# Patient Record
Sex: FEMALE | Race: WHITE | URBAN - METROPOLITAN AREA
[De-identification: names, ages, dates, MRNs, and addresses within clinical notes are randomized per-mention and may not be internally consistent; named-entity substitution may affect disease eponyms.]

---

## 2023-09-12 ENCOUNTER — APPOINTMENT (OUTPATIENT)
Dept: GENERAL RADIOLOGY | Age: 23
End: 2023-09-12

## 2023-09-12 ENCOUNTER — APPOINTMENT (OUTPATIENT)
Dept: CT IMAGING | Age: 23
End: 2023-09-12

## 2023-09-12 ENCOUNTER — HOSPITAL ENCOUNTER (EMERGENCY)
Age: 23
Discharge: OTHER INSTITUTION | End: 2023-09-12
Attending: EMERGENCY MEDICINE

## 2023-09-12 VITALS
WEIGHT: 196.9 LBS | RESPIRATION RATE: 14 BRPM | TEMPERATURE: 98.3 F | BODY MASS INDEX: 30.9 KG/M2 | OXYGEN SATURATION: 98 % | SYSTOLIC BLOOD PRESSURE: 115 MMHG | HEIGHT: 67 IN | HEART RATE: 78 BPM | DIASTOLIC BLOOD PRESSURE: 71 MMHG

## 2023-09-12 DIAGNOSIS — F12.90 CANNABIS USE DISORDER: ICD-10-CM

## 2023-09-12 DIAGNOSIS — Z86.59 HISTORY OF BIPOLAR DISORDER: ICD-10-CM

## 2023-09-12 DIAGNOSIS — F29 PSYCHOSIS, UNSPECIFIED PSYCHOSIS TYPE (HCC): Primary | ICD-10-CM

## 2023-09-12 LAB
ACETAMINOPHEN LEVEL: <5 UG/ML (ref 15–30)
ALBUMIN SERPL-MCNC: 4.2 GM/DL (ref 3.4–5)
ALCOHOL SCREEN SERUM: <0.01 %WT/VOL
ALP BLD-CCNC: 87 IU/L (ref 40–129)
ALT SERPL-CCNC: 36 U/L (ref 10–40)
AMPHETAMINES: NEGATIVE
ANION GAP SERPL CALCULATED.3IONS-SCNC: 12 MMOL/L (ref 4–16)
AST SERPL-CCNC: 66 IU/L (ref 15–37)
BARBITURATE SCREEN URINE: NEGATIVE
BASOPHILS ABSOLUTE: 0 K/CU MM
BASOPHILS RELATIVE PERCENT: 0.3 % (ref 0–1)
BENZODIAZEPINE SCREEN, URINE: NEGATIVE
BILIRUB SERPL-MCNC: 0.3 MG/DL (ref 0–1)
BILIRUBIN DIRECT: 0.2 MG/DL (ref 0–0.3)
BILIRUBIN, INDIRECT: 0.1 MG/DL (ref 0–0.7)
BUN SERPL-MCNC: 9 MG/DL (ref 6–23)
CALCIUM SERPL-MCNC: 8.9 MG/DL (ref 8.3–10.6)
CANNABINOID SCREEN URINE: ABNORMAL
CHLORIDE BLD-SCNC: 105 MMOL/L (ref 99–110)
CO2: 20 MMOL/L (ref 21–32)
COCAINE METABOLITE: NEGATIVE
CREAT SERPL-MCNC: 0.4 MG/DL (ref 0.6–1.1)
DIFFERENTIAL TYPE: ABNORMAL
DOSE AMOUNT: ABNORMAL
DOSE AMOUNT: ABNORMAL
DOSE TIME: ABNORMAL
DOSE TIME: ABNORMAL
EOSINOPHILS ABSOLUTE: 0.1 K/CU MM
EOSINOPHILS RELATIVE PERCENT: 1 % (ref 0–3)
FENTANYL URINE: NEGATIVE
GFR SERPL CREATININE-BSD FRML MDRD: >60 ML/MIN/1.73M2
GLUCOSE SERPL-MCNC: 95 MG/DL (ref 70–99)
HCG QUALITATIVE: NEGATIVE
HCT VFR BLD CALC: 42.4 % (ref 37–47)
HEMOGLOBIN: 13.5 GM/DL (ref 12.5–16)
IMMATURE NEUTROPHIL %: 0.4 % (ref 0–0.43)
LYMPHOCYTES ABSOLUTE: 2.1 K/CU MM
LYMPHOCYTES RELATIVE PERCENT: 17.8 % (ref 24–44)
MCH RBC QN AUTO: 26.3 PG (ref 27–31)
MCHC RBC AUTO-ENTMCNC: 31.8 % (ref 32–36)
MCV RBC AUTO: 82.5 FL (ref 78–100)
MONOCYTES ABSOLUTE: 0.7 K/CU MM
MONOCYTES RELATIVE PERCENT: 6 % (ref 0–4)
OPIATES, URINE: NEGATIVE
OXYCODONE: NEGATIVE
PDW BLD-RTO: 14.6 % (ref 11.7–14.9)
PLATELET # BLD: 395 K/CU MM (ref 140–440)
PMV BLD AUTO: 9.3 FL (ref 7.5–11.1)
POTASSIUM SERPL-SCNC: 3.8 MMOL/L (ref 3.5–5.1)
RBC # BLD: 5.14 M/CU MM (ref 4.2–5.4)
SALICYLATE LEVEL: <0.3 MG/DL (ref 15–30)
SARS-COV-2 RDRP RESP QL NAA+PROBE: NOT DETECTED
SEGMENTED NEUTROPHILS ABSOLUTE COUNT: 8.9 K/CU MM
SEGMENTED NEUTROPHILS RELATIVE PERCENT: 74.5 % (ref 36–66)
SODIUM BLD-SCNC: 137 MMOL/L (ref 135–145)
SOURCE: NORMAL
TOTAL IMMATURE NEUTOROPHIL: 0.05 K/CU MM
TOTAL PROTEIN: 6.6 GM/DL (ref 6.4–8.2)
WBC # BLD: 11.9 K/CU MM (ref 4–10.5)

## 2023-09-12 PROCEDURE — 6360000002 HC RX W HCPCS: Performed by: EMERGENCY MEDICINE

## 2023-09-12 PROCEDURE — G0480 DRUG TEST DEF 1-7 CLASSES: HCPCS

## 2023-09-12 PROCEDURE — 73610 X-RAY EXAM OF ANKLE: CPT

## 2023-09-12 PROCEDURE — 70450 CT HEAD/BRAIN W/O DYE: CPT

## 2023-09-12 PROCEDURE — 84703 CHORIONIC GONADOTROPIN ASSAY: CPT

## 2023-09-12 PROCEDURE — 85025 COMPLETE CBC W/AUTO DIFF WBC: CPT

## 2023-09-12 PROCEDURE — 87635 SARS-COV-2 COVID-19 AMP PRB: CPT

## 2023-09-12 PROCEDURE — 82248 BILIRUBIN DIRECT: CPT

## 2023-09-12 PROCEDURE — 96372 THER/PROPH/DIAG INJ SC/IM: CPT

## 2023-09-12 PROCEDURE — 6370000000 HC RX 637 (ALT 250 FOR IP): Performed by: EMERGENCY MEDICINE

## 2023-09-12 PROCEDURE — 72125 CT NECK SPINE W/O DYE: CPT

## 2023-09-12 PROCEDURE — 99285 EMERGENCY DEPT VISIT HI MDM: CPT

## 2023-09-12 PROCEDURE — 80307 DRUG TEST PRSMV CHEM ANLYZR: CPT

## 2023-09-12 PROCEDURE — 90792 PSYCH DIAG EVAL W/MED SRVCS: CPT | Performed by: PSYCHIATRY & NEUROLOGY

## 2023-09-12 PROCEDURE — 80053 COMPREHEN METABOLIC PANEL: CPT

## 2023-09-12 RX ORDER — HALOPERIDOL 5 MG/ML
10 INJECTION INTRAMUSCULAR ONCE
Status: COMPLETED | OUTPATIENT
Start: 2023-09-12 | End: 2023-09-12

## 2023-09-12 RX ORDER — LORAZEPAM 1 MG/1
2 TABLET ORAL ONCE
Status: COMPLETED | OUTPATIENT
Start: 2023-09-12 | End: 2023-09-12

## 2023-09-12 RX ADMIN — LORAZEPAM 2 MG: 1 TABLET ORAL at 05:51

## 2023-09-12 RX ADMIN — HALOPERIDOL LACTATE 10 MG: 5 INJECTION, SOLUTION INTRAMUSCULAR at 05:52

## 2023-09-12 NOTE — ED NOTES
Hourly rounding, pt sleeping no distress noted, sitter at  bedside, continue to monitor     Delfin Strauss RN  09/12/23 0683

## 2023-09-12 NOTE — ED NOTES
Pt placed in green gown and all belongings placed in bag. Blood work and Urine collected. Pt very cooperative.       Shelby Holly RN  09/12/23 0600

## 2023-09-12 NOTE — ED NOTES
The boyfriend Bert Field was notified of the transfer. All personal belongings were sent with the patient.       Michael Loza RN  09/12/23 9123

## 2023-09-12 NOTE — ED NOTES
Report was given to the EMS crew and report was called to Santos Moore at [employer] [facility] [employer].       Chao Castañeda RN  09/12/23 8952

## 2023-09-12 NOTE — ED NOTES
I called Dr. Pedro Luis Sim again he said he will see the patient in 20 minutes.               Audrey Camp RN  09/12/23 6621

## 2023-09-12 NOTE — ED PROVIDER NOTES
CHIEF COMPLAINT    Chief Complaint   Patient presents with    Mental Health Problem     Pt states she has a seizure everyday. Pt had \" 20 seizures today. \"      HPI  Mariama Hernandez is a 21 y.o. female who presents to the ED via [facility] initially: The patient called for seizure activity. They do not have any patient identifying information for the patient other than a general name. They apparently picked her up from a residence with multiple police on scene. The patient herself initially tells me she is here due to having 20 seizures per hour for days. She also states she has a broken ankle. Patient believes that she also has a concussion and cannot tell me her date of birth. In speaking with the patient she tells me that she has been experiencing seizures since the episode of 6 but has never been evaluated for this as she has been keeping a secret from everyone because she does not trust them. When she has seizures she tells me that she shakes all over uncontrollably. Patient states that she fell out of bed recently as well and believes she struck her head as well as her left ankle. She cannot tell me exactly when this happened or describe her pain or any alleviating or aggravating factors. During the course of her interview she does have some episodes of inappropriate laughter and is difficult to redirect at times. She tells me that she does smoke marijuana daily but does not use any other medications or recreational drugs. When asked about her ankle she states the pain is very bad but cannot describe the character of this pain. Shortly after her arrival her brother Alok Schulz lives with her [phone]) states the patient has a history of bipolar/schizophrenia and has required psychiatric hospitalizations in the past.  He states she has been experiencing increasing frequency of episodes where she will laugh inappropriately and take off running.   He states she has been demonstrating very components within normal limits   COVID-19, RAPID   HCG, SERUM, QUALITATIVE   ETHANOL     I personally reviewed the images. The radiologist's interpretation reveals:  Last Imaging results   CT HEAD WO CONTRAST    (Results Pending)   CT CERVICAL SPINE WO CONTRAST    (Results Pending)   XR ANKLE LEFT (MIN 3 VIEWS)    (Results Pending)       MEDS GIVEN IN ED:  Medications   haloperidol lactate (HALDOL) injection 10 mg (10 mg IntraMUSCular Given 9/12/23 0552)   LORazepam (ATIVAN) tablet 2 mg (2 mg Oral Given 9/12/23 0551)     COURSE & MEDICAL DECISION MAKING  80-year-old female presents emergency department via EMS initially with report from EMS of seizure-like activity that patient had complained of. They have no other medical history or full identifying information for the patient. On my initial assessment the patient she tells me she has been experiencing seizures since the age of 10 but keeping the secret with 20 seizures per hour daily. She is also concerned about breaking her ankle and has multiple episodes of inappropriate laughter during our conversation. Her brother did call the emergency department and informed us that she has bipolar disorder and may be schizophrenia is required psychiatric hospitalization in the past.  We initially did not have any previous medical records for the patient but after speaking with friend and brother we were able to get her outside medical records. It seems she does have history of bipolar disorder with recent hospitalization at [facility] from 08/29 until 09/04 secondary to manic episode after initially being seen in the emergency department at [facility] \[facility].\"". it seems that at discharge she was supposed to be receiving Invega injections. However, in speaking with the patient's brother it seems that she has decompensated in the last 2 to 3 days with inappropriate behavior at home.   She has complained of falls at home out of bed and brother states she has been running around

## 2023-09-12 NOTE — ED PROVIDER NOTES
09/12/23:a.m. Mohan Singletary was checked out to me by Dr. Migel Edge. Please see his/her initial documentation for details of the patient's ED presentation, physical exam and completed studies. In brief, Mohan Singletary is a 21 y.o. female that presents with psychosis awaiting labs/imaging/eval/dispo.     I have reviewed and interpreted all of the currently available lab results from this visit (if applicable):  Results for orders placed or performed during the hospital encounter of 09/12/23   COVID-19, Rapid    Specimen: Nasopharyngeal   Result Value Ref Range    Source NARES     SARS-CoV-2, NAAT NOT DETECTED NOT DETECTED   CBC with Auto Differential   Result Value Ref Range    WBC 11.9 (H) 4.0 - 10.5 K/CU MM    RBC 5.14 4.2 - 5.4 M/CU MM    Hemoglobin 13.5 12.5 - 16.0 GM/DL    Hematocrit 42.4 37 - 47 %    MCV 82.5 78 - 100 FL    MCH 26.3 (L) 27 - 31 PG    MCHC 31.8 (L) 32.0 - 36.0 %    RDW 14.6 11.7 - 14.9 %    Platelets 853 821 - 552 K/CU MM    MPV 9.3 7.5 - 11.1 FL    Differential Type AUTOMATED DIFFERENTIAL     Segs Relative 74.5 (H) 36 - 66 %    Lymphocytes % 17.8 (L) 24 - 44 %    Monocytes % 6.0 (H) 0 - 4 %    Eosinophils % 1.0 0 - 3 %    Basophils % 0.3 0 - 1 %    Segs Absolute 8.9 K/CU MM    Lymphocytes Absolute 2.1 K/CU MM    Monocytes Absolute 0.7 K/CU MM    Eosinophils Absolute 0.1 K/CU MM    Basophils Absolute 0.0 K/CU MM    Immature Neutrophil % 0.4 0 - 0.43 %    Total Immature Neutrophil 0.05 K/CU MM   Basic Metabolic Panel   Result Value Ref Range    Sodium 137 135 - 145 MMOL/L    Potassium 3.8 3.5 - 5.1 MMOL/L    Chloride 105 99 - 110 mMol/L    CO2 20 (L) 21 - 32 MMOL/L    Anion Gap 12 4 - 16    BUN 9 6 - 23 MG/DL    Creatinine 0.4 (L) 0.6 - 1.1 MG/DL    Est, Glom Filt Rate >60 >60 mL/min/1.73m2    Glucose 95 70 - 99 MG/DL    Calcium 8.9 8.3 - 10.6 MG/DL   Hepatic Function Panel   Result Value Ref Range    Albumin 4.2 3.4 - 5.0 GM/DL    Total Bilirubin 0.3 0.0 - 1.0 MG/DL    Bilirubin, Direct 0.2

## 2023-09-12 NOTE — ED NOTES
The patient walked to the bathroom. She says that she can talk to the doctor. I called Dr. Tutu Dick.                   Valerio Garcia RN  09/12/23 9055

## 2023-09-12 NOTE — ED NOTES
The patient is awake and ambulated to the bathroom. She was updated on the  admission and transfer.       Karlie Watts RN  09/12/23 8561

## 2023-09-12 NOTE — ED NOTES
Sitter at the bedside, the patient is sleeping, cords and monitors have been removed from the room. The patient is in a safety gown and nonskid socks are on.              Aristides Ware RN  09/12/23 5366

## 2023-09-12 NOTE — VIRTUAL HEALTH
Appearance: appears stated age. alert and oriented to person, place, time. no acute distress. Adequate grooming and hygeine. Good eye contact. No prominent physical abnormalities. Attitude: Manner is uncooperative with bizarre responses  Motor: No psychomotor agitation, retardation or abnormal movements noted  Speech: Clearly articulated; normal rate, volume, tone & amount. Language: intact understanding and production  Mood: anxious  Affect: hypomanic  Thought Production: Spontaneous. Thought Form: Noted tangentiality and circumstantiality with flight of ideas and loosening of associations. Thought Content/Perceptions: No INGRID, noted AVH, noted delusion  Insight: questionable  Judgment questionable  Memory: Immediate, recent, and remote appear intact, though not formally tested. Attention: maintained throughout interview  Fund of knowledge: Average  Gait/Balance: WNL/WNL  CSSRS- risk: Moderate  PT denies any access to weapons at this time. At home only accessible to house hold items  PT denies any past suicide attempts or behavior  Pt noted hx of SI  per verbal statements         Impression:   Acute psychosis    Problem List:   <principal problem not specified>    Pt requires inpt psychiatric admission once medically cleared, pt reqires sitter until transfer. Plan:  1. Reviewed treatment plan with patient including medication risks, benefits, side effects. Obtained informed consent for treatment. 2. Psychiatric management:medication initiation and titration, recommend inpt mental health admission, safe and theraputic environment. 3. Status of problem/condition: ?pending  4. Medical co-morbidities: Management per [facility], appreciate assistance  5. Legal Status: involuntary (psychosis, unable to care for self)  6. The treatment team reviewed with the patient the diagnosis and treatment recommendations to include the risks, benefits, and side effects of chosen medications.   7. The patient

## 2023-09-12 NOTE — ED NOTES
I attempted to wake the patient again so we can get the psych assessment completed with Dr. Amie Manzanares. The patient is still sleeping.                Brandon Watts RN  09/12/23 3223

## 2023-09-12 NOTE — ED NOTES
Received report from night shift and assumed care of this patient. In short the patient called medics herself because she was having seizures. On arrival the patient is not having seizures and is not able to give staff appropriate information such as date of birth. A friend arrived and was able to give us the date of birth and social security number. With that information we were able to access care everywhere. The patient was just recently discharged from a mental health facility after an admission.        Valerio Garcia RN  09/12/23 8310

## 2023-09-12 NOTE — ED NOTES
Nursing staff remained with patient until sitter arrived.      Yessi Deutsch RN  09/12/23 467  Shyla Valera, RN  09/12/23 6687

## 2023-09-12 NOTE — ED NOTES
Dr. Shayna Gale called back however the patient is unable to be assessed at this time as she had been sedated with Ativan and Haldol. I will call him again when she wakes up.                      Afia Damico RN  09/12/23 7975

## 2023-09-12 NOTE — ED NOTES
The patient was given water to drink, a healthy choice meal, and an uncrustable.                Keesha Carter RN  09/12/23 6141

## 2023-09-12 NOTE — ED NOTES
The patient ambulated to the bathroom, apple juice provided and another uncrustable.           Leanna Montgomery RN  09/12/23 5924

## 2023-09-12 NOTE — ED NOTES
Dr. Eli Machado has completed the assessment and is recommending placement.       Zelda Varela RN  09/12/23 8724